# Patient Record
Sex: FEMALE | Race: WHITE | NOT HISPANIC OR LATINO | Employment: STUDENT | URBAN - METROPOLITAN AREA
[De-identification: names, ages, dates, MRNs, and addresses within clinical notes are randomized per-mention and may not be internally consistent; named-entity substitution may affect disease eponyms.]

---
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
## 2025-02-09 PROBLEM — R53.83 FATIGUE: Status: ACTIVE | Noted: 2025-02-09

## 2025-02-09 PROBLEM — Z13.9 ENCOUNTER FOR MEDICAL SCREENING EXAMINATION: Status: ACTIVE | Noted: 2025-02-09

## 2025-02-20 ENCOUNTER — OFFICE VISIT (OUTPATIENT)
Dept: PSYCHIATRY | Facility: CLINIC | Age: 20
End: 2025-02-20
Payer: COMMERCIAL

## 2025-02-20 VITALS
SYSTOLIC BLOOD PRESSURE: 104 MMHG | BODY MASS INDEX: 30.65 KG/M2 | DIASTOLIC BLOOD PRESSURE: 74 MMHG | HEART RATE: 84 BPM | WEIGHT: 201.63 LBS

## 2025-02-20 DIAGNOSIS — F84.0 AUTISM: ICD-10-CM

## 2025-02-20 DIAGNOSIS — F33.9 MAJOR DEPRESSIVE DISORDER, RECURRENT EPISODE WITH ANXIOUS DISTRESS: Primary | ICD-10-CM

## 2025-02-20 PROCEDURE — 1160F RVW MEDS BY RX/DR IN RCRD: CPT | Mod: CPTII,S$GLB,, | Performed by: PSYCHIATRY & NEUROLOGY

## 2025-02-20 PROCEDURE — 90792 PSYCH DIAG EVAL W/MED SRVCS: CPT | Mod: S$GLB,,, | Performed by: PSYCHIATRY & NEUROLOGY

## 2025-02-20 PROCEDURE — 1159F MED LIST DOCD IN RCRD: CPT | Mod: CPTII,S$GLB,, | Performed by: PSYCHIATRY & NEUROLOGY

## 2025-02-20 PROCEDURE — 3078F DIAST BP <80 MM HG: CPT | Mod: CPTII,S$GLB,, | Performed by: PSYCHIATRY & NEUROLOGY

## 2025-02-20 PROCEDURE — 3074F SYST BP LT 130 MM HG: CPT | Mod: CPTII,S$GLB,, | Performed by: PSYCHIATRY & NEUROLOGY

## 2025-02-20 NOTE — PROGRESS NOTES
[facility]      New Psychiatry Note    2025    Referred by:  Suellen Soni NP    CHIEF COMPLAINT:  Haylee presents for a new patient visit following recent hospitalization for anger management, and to transfer care from Dr. Jimenez, whom she last saw in May 2023.    HPI:  Haylee reports a history of hospitalizations, including one at age 15 in a children[facility] in [place] due to anxiety and school avoidance. The most recent hospitalization was earlier this month at [facility] in [place] to help control her anger. During this hospitalization, her medications were adjusted: Fluoxetine was discontinued, risperidone was increased to 1mg twice daily, and Depakine (liquid medication) was added.    Haylee experienced lightheadedness yesterday after waking from a nap, which she attributes to potentially adjusting to the new medication regimen. This symptom has not recurred today.    Haylee spends her days helping with her 3-year-old brother and works at [employer], reporting good relationships with coworkers and customers. She lives at home with her father, stepmother, brother, and grandparents (who live in a camper on the property). She recently got engaged and has been in a relationship for almost two years.    Haylee expresses a desire for therapy to address her anger issues. Her uncle, who accompanied her to the appointment, mentions that she can be set off by slight provocations.    Haylee has a Nexplanon implant for birth control, set to  in 2025.  She is not planning on pregnancy at present.    MEDICATIONS:  Haylee is on Risperidone 1 mg twice daily, with a recent change in dosage. She takes Depakine 500 mg nightly for sleep and Venlafaxine 150 mg daily. Haylee has a Nexplanon implant for birth control, which is due to  in 2025.    LIFESTYLE:  Haylee works at [employer] and reports that her job is going well. She gets along with her co-workers and  customers. She is engaged and has been in a relationship for almost two years. Haylee has a Nexplanon implant for birth control, expiring in August 2025, and states she is not planning on having a baby at this time. She lives with her father, stepmother, and 3-year-old brother. Her brother, grandmother, and grandfather live in campers on the property. Haylee reports good relationships with everyone at home. Her typical day involves helping with her young brother. For this appointment, her uncle accompanied her as her father was unable to attend.    LABS:  Recent labs were normal    ROS:  General: -fever, -chills, -fatigue, -weight gain, -weight loss  Eyes: -vision changes, -redness, -discharge  ENT: -ear pain, -nasal congestion, -sore throat  Cardiovascular: -chest pain, -palpitations, -lower extremity edema  Respiratory: -cough, -shortness of breath  Gastrointestinal: -abdominal pain, -nausea, -vomiting, -diarrhea, -constipation, -blood in stool  Genitourinary: -dysuria, -hematuria, -frequency  Musculoskeletal: -joint pain, -muscle pain  Skin: -rash, -lesion  Neurological: -headache, -dizziness, -numbness, -tingling, +lightheadedness  Psychiatric: -anxiety, -depression, -sleep difficulty  A review of 10+ systems was conducted with pertinent positive and negative findings documented in HPI with all other systems reviewed and negative.    Past medical, family, surgical and social history reviewed as documented in chart with pertinent positive medical, family, surgical and social history detailed in HPI.    Exam Findings:    Physical Exam    General: No acute distress. Well-developed. Well-nourished.  Eyes: EOMI. Sclerae anicteric.  HENT: Normocephalic. Atraumatic. Nares patent. Moist oral mucosa.  Ears: Bilateral TMs clear. Bilateral EACs clear.  Cardiovascular: Regular rate. Regular rhythm. No murmurs. No rubs. No gallops. Normal S1, S2.  Respiratory: Normal respiratory effort. Clear to auscultation bilaterally. No  rales. No rhonchi. No wheezing.  Abdomen: Soft. Non-tender. Non-distended. Normoactive bowel sounds.  Musculoskeletal: No  obvious deformity.  Extremities: No lower extremity edema.  Neurological: Oriented to person. Oriented to place. Oriented to time. No slurred speech. Normal gait.  Psychiatric: Normal mood. Normal affect. Good insight. Good judgment.  Skin: Warm. Dry. No rash.        MSE  Appearance: casual dress  Behavior: cooperative   Speech: normal rate and volume  Mood/affect: euthymic  TC: no SI/ no HI  TP: linear  Insight/ Judgement: fair    Assessment/Plan:    Assessment & Plan    F33.9 MDD, recurrent episode and anxious distress  F84.0 Autism disorder    MOOD DISORDER AND ANXIETY:  - Reviewed recent hospitalization at [facility] for anger management.  - Assessed current medication regimen, including recent changes from hospitalization.  - Evaluated need for therapy and considered virtual options for convenience.  - Continued Risperidone 1 mg twice daily.  - Continued Depakine 500 mg nightly.  - Continued Venlafaxine 150 mg daily.  - Consider referral to virtual therapist for counseling.    MEDICATION MANAGEMENT:  - Verified patient is on Nexplanon for birth control, alleviating immediate concerns about Depakine risks (neural tube defects)  - Noted need for future Depakote level check and liver function monitoring.      FOLLOW-UP CARE:  - Follow up in 2 months (end of April or beginning of May).  - Option for virtual or in-person appointment.  - Use [facility] for messaging and appointment scheduling.